# Patient Record
Sex: FEMALE
[De-identification: names, ages, dates, MRNs, and addresses within clinical notes are randomized per-mention and may not be internally consistent; named-entity substitution may affect disease eponyms.]

---

## 2017-04-24 ENCOUNTER — RECORD ABSTRACTING (OUTPATIENT)
Age: 47
End: 2017-04-24

## 2017-05-02 ENCOUNTER — APPOINTMENT (OUTPATIENT)
Dept: NEUROLOGY | Facility: CLINIC | Age: 47
End: 2017-05-02

## 2017-05-02 VITALS
BODY MASS INDEX: 28.17 KG/M2 | SYSTOLIC BLOOD PRESSURE: 120 MMHG | WEIGHT: 165 LBS | HEART RATE: 72 BPM | DIASTOLIC BLOOD PRESSURE: 70 MMHG | HEIGHT: 64 IN

## 2017-05-02 VITALS
SYSTOLIC BLOOD PRESSURE: 120 MMHG | WEIGHT: 165 LBS | HEART RATE: 72 BPM | BODY MASS INDEX: 28.17 KG/M2 | DIASTOLIC BLOOD PRESSURE: 70 MMHG | HEIGHT: 64 IN

## 2017-05-02 DIAGNOSIS — F41.9 ANXIETY DISORDER, UNSPECIFIED: ICD-10-CM

## 2017-05-02 DIAGNOSIS — F32.9 MAJOR DEPRESSIVE DISORDER, SINGLE EPISODE, UNSPECIFIED: ICD-10-CM

## 2017-05-02 DIAGNOSIS — Z80.3 FAMILY HISTORY OF MALIGNANT NEOPLASM OF BREAST: ICD-10-CM

## 2017-05-02 DIAGNOSIS — Z78.9 OTHER SPECIFIED HEALTH STATUS: ICD-10-CM

## 2017-05-02 DIAGNOSIS — Z86.79 PERSONAL HISTORY OF OTHER DISEASES OF THE CIRCULATORY SYSTEM: ICD-10-CM

## 2017-05-02 DIAGNOSIS — Z81.8 FAMILY HISTORY OF OTHER MENTAL AND BEHAVIORAL DISORDERS: ICD-10-CM

## 2017-05-02 DIAGNOSIS — I10 ESSENTIAL (PRIMARY) HYPERTENSION: ICD-10-CM

## 2017-05-02 RX ORDER — ARIPIPRAZOLE 5 MG/1
5 TABLET ORAL DAILY
Refills: 0 | Status: ACTIVE | COMMUNITY

## 2017-05-02 RX ORDER — DULOXETINE HYDROCHLORIDE 40 MG/1
40 CAPSULE, DELAYED RELEASE PELLETS ORAL
Refills: 0 | Status: ACTIVE | COMMUNITY

## 2024-09-26 ENCOUNTER — APPOINTMENT (OUTPATIENT)
Dept: OBGYN | Facility: CLINIC | Age: 54
End: 2024-09-26
Payer: COMMERCIAL

## 2024-09-26 VITALS
HEIGHT: 64 IN | DIASTOLIC BLOOD PRESSURE: 80 MMHG | SYSTOLIC BLOOD PRESSURE: 128 MMHG | WEIGHT: 178 LBS | BODY MASS INDEX: 30.39 KG/M2

## 2024-09-26 DIAGNOSIS — D25.9 LEIOMYOMA OF UTERUS, UNSPECIFIED: ICD-10-CM

## 2024-09-26 PROCEDURE — 99203 OFFICE O/P NEW LOW 30 MIN: CPT

## 2024-09-28 PROBLEM — D25.9 FIBROID, UTERINE: Status: ACTIVE | Noted: 2024-09-28

## 2024-09-28 NOTE — PLAN
[FreeTextEntry1] : Discussed the option of continued conservative observation of fibroids vs surgical removal. Treatment options of myomectomy, hysterectomy, ufe and continued observation were also outlined. A detailed discussion regarding the option of myomectomy vs hysterectomy was also held and the risks, benefits, and alternatives provided. All questions answered discussed the risks including but not limited to need for laparotomy, blood transfusion , possible postop infection, and injury to to GI or  tract. Pt with asymptomatic fibroid for conservative mgt to have fu pelvic sono in Jan if no change to continue with conservative mgt. During this visit 40 minutes were spent face-to-face with greater than 50% of the time dedicated to counseling.

## 2024-09-28 NOTE — HISTORY OF PRESENT ILLNESS
[FreeTextEntry1] : Pt is a 52 yo  w/hx of HTN presenting for a fibroid noted on a baseline scan. Denies pelvic pain. Endorses frequent urination for the past 20yrs (10x per day), small volume leaking with coughing/laughing. denies difficulty with BMs. Last period in . No other concerns today  PMH: HTN, Hypercholesterolemia, prediabetes, Plasminogen Activator Inhibitor-1 4G/4G  PSH: Eye surgery OBHx: , SVDx1 GYN: see above, no h/o abnormal paps (last 2024)  Meds: duloxetine, aripiprazole, losartan, atorvastatin, aspirin All: Penicillin (childhood, unsure of reaction) FH:  Identical twin sister w/breast cancer at age 31yo (BRCA negative), Mother w/HTN and stroke at age 54yo and clotting disorder, father with heart disease  SH: No A/T/D  Imaging:  MRI 2024: Uterus 8x3.4x5.2cm, 9.4cm pedunculated posterior/midline fibroid

## 2024-09-28 NOTE — HISTORY OF PRESENT ILLNESS
[FreeTextEntry1] : Pt is a 52 yo  w/hx of HTN presenting for a fibroid noted on a baseline scan. Denies pelvic pain. Endorses frequent urination for the past 20yrs (10x per day), small volume leaking with coughing/laughing. denies difficulty with BMs. Last period in . No other concerns today  PMH: HTN, Hypercholesterolemia, prediabetes, Plasminogen Activator Inhibitor-1 4G/4G  PSH: Eye surgery OBHx: , SVDx1 GYN: see above, no h/o abnormal paps (last 2024)  Meds: duloxetine, aripiprazole, losartan, atorvastatin, aspirin All: Penicillin (childhood, unsure of reaction) FH:  Identical twin sister w/breast cancer at age 29yo (BRCA negative), Mother w/HTN and stroke at age 54yo and clotting disorder, father with heart disease  SH: No A/T/D  Imaging:  MRI 2024: Uterus 8x3.4x5.2cm, 9.4cm pedunculated posterior/midline fibroid